# Patient Record
Sex: FEMALE | Race: WHITE | NOT HISPANIC OR LATINO | URBAN - METROPOLITAN AREA
[De-identification: names, ages, dates, MRNs, and addresses within clinical notes are randomized per-mention and may not be internally consistent; named-entity substitution may affect disease eponyms.]

---

## 2017-03-16 ENCOUNTER — OUTPATIENT (OUTPATIENT)
Dept: OUTPATIENT SERVICES | Facility: HOSPITAL | Age: 57
LOS: 1 days | Discharge: HOME | End: 2017-03-16

## 2017-03-16 DIAGNOSIS — C50.919 MALIGNANT NEOPLASM OF UNSPECIFIED SITE OF UNSPECIFIED FEMALE BREAST: ICD-10-CM

## 2017-03-17 ENCOUNTER — APPOINTMENT (OUTPATIENT)
Dept: HEMATOLOGY ONCOLOGY | Facility: CLINIC | Age: 57
End: 2017-03-17

## 2017-03-17 VITALS
HEART RATE: 81 BPM | RESPIRATION RATE: 16 BRPM | TEMPERATURE: 98.4 F | OXYGEN SATURATION: 99 % | SYSTOLIC BLOOD PRESSURE: 183 MMHG | BODY MASS INDEX: 30.29 KG/M2 | WEIGHT: 188.5 LBS | DIASTOLIC BLOOD PRESSURE: 85 MMHG

## 2017-03-17 DIAGNOSIS — I10 ESSENTIAL (PRIMARY) HYPERTENSION: ICD-10-CM

## 2017-04-18 ENCOUNTER — RESULT REVIEW (OUTPATIENT)
Age: 57
End: 2017-04-18

## 2017-09-29 ENCOUNTER — OUTPATIENT (OUTPATIENT)
Dept: OUTPATIENT SERVICES | Facility: HOSPITAL | Age: 57
LOS: 1 days | Discharge: HOME | End: 2017-09-29

## 2017-09-29 DIAGNOSIS — C50.919 MALIGNANT NEOPLASM OF UNSPECIFIED SITE OF UNSPECIFIED FEMALE BREAST: ICD-10-CM

## 2017-10-03 ENCOUNTER — APPOINTMENT (OUTPATIENT)
Dept: HEMATOLOGY ONCOLOGY | Facility: CLINIC | Age: 57
End: 2017-10-03
Payer: COMMERCIAL

## 2017-10-03 VITALS
OXYGEN SATURATION: 100 % | TEMPERATURE: 98.3 F | BODY MASS INDEX: 30.05 KG/M2 | WEIGHT: 186.95 LBS | DIASTOLIC BLOOD PRESSURE: 84 MMHG | HEART RATE: 77 BPM | SYSTOLIC BLOOD PRESSURE: 162 MMHG | RESPIRATION RATE: 16 BRPM

## 2017-10-03 PROCEDURE — 99214 OFFICE O/P EST MOD 30 MIN: CPT

## 2017-10-03 RX ORDER — PRAVASTATIN SODIUM 20 MG/1
20 TABLET ORAL DAILY
Qty: 30 | Refills: 0 | Status: DISCONTINUED | COMMUNITY
Start: 2017-03-17 | End: 2017-10-03

## 2017-10-03 RX ORDER — UBIDECARENONE/VIT E ACET 100MG-5
50 MCG CAPSULE ORAL
Qty: 1 | Refills: 0 | Status: ACTIVE | COMMUNITY
Start: 2017-10-03

## 2018-04-02 ENCOUNTER — OUTPATIENT (OUTPATIENT)
Dept: OUTPATIENT SERVICES | Facility: HOSPITAL | Age: 58
LOS: 1 days | Discharge: HOME | End: 2018-04-02

## 2018-04-02 DIAGNOSIS — C50.919 MALIGNANT NEOPLASM OF UNSPECIFIED SITE OF UNSPECIFIED FEMALE BREAST: ICD-10-CM

## 2018-04-20 ENCOUNTER — APPOINTMENT (OUTPATIENT)
Dept: HEMATOLOGY ONCOLOGY | Facility: CLINIC | Age: 58
End: 2018-04-20
Payer: COMMERCIAL

## 2018-04-20 VITALS
DIASTOLIC BLOOD PRESSURE: 80 MMHG | TEMPERATURE: 98.1 F | RESPIRATION RATE: 16 BRPM | HEART RATE: 79 BPM | SYSTOLIC BLOOD PRESSURE: 130 MMHG | WEIGHT: 185.19 LBS | BODY MASS INDEX: 29.76 KG/M2 | OXYGEN SATURATION: 99 %

## 2018-04-20 PROCEDURE — 99214 OFFICE O/P EST MOD 30 MIN: CPT

## 2018-08-14 ENCOUNTER — RESULT REVIEW (OUTPATIENT)
Age: 58
End: 2018-08-14

## 2018-12-06 ENCOUNTER — RESULT REVIEW (OUTPATIENT)
Age: 58
End: 2018-12-06

## 2020-01-02 ENCOUNTER — RESULT REVIEW (OUTPATIENT)
Age: 60
End: 2020-01-02

## 2021-01-29 ENCOUNTER — RESULT REVIEW (OUTPATIENT)
Age: 61
End: 2021-01-29

## 2021-03-18 ENCOUNTER — APPOINTMENT (OUTPATIENT)
Dept: ULTRASOUND IMAGING | Facility: HOSPITAL | Age: 61
End: 2021-03-18
Payer: COMMERCIAL

## 2021-03-18 ENCOUNTER — OUTPATIENT (OUTPATIENT)
Dept: OUTPATIENT SERVICES | Facility: HOSPITAL | Age: 61
LOS: 1 days | End: 2021-03-18
Payer: COMMERCIAL

## 2021-03-18 ENCOUNTER — APPOINTMENT (OUTPATIENT)
Dept: MAMMOGRAPHY | Facility: HOSPITAL | Age: 61
End: 2021-03-18
Payer: COMMERCIAL

## 2021-03-18 PROCEDURE — 77065 DX MAMMO INCL CAD UNI: CPT | Mod: 26,LT

## 2021-03-18 PROCEDURE — 19285 PERQ DEV BREAST 1ST US IMAG: CPT | Mod: LT

## 2021-03-18 PROCEDURE — 19285 PERQ DEV BREAST 1ST US IMAG: CPT

## 2021-03-18 PROCEDURE — C1739: CPT

## 2021-03-18 PROCEDURE — 77065 DX MAMMO INCL CAD UNI: CPT

## 2021-03-19 ENCOUNTER — APPOINTMENT (OUTPATIENT)
Dept: DISASTER EMERGENCY | Facility: CLINIC | Age: 61
End: 2021-03-19

## 2021-03-19 DIAGNOSIS — Z01.818 ENCOUNTER FOR OTHER PREPROCEDURAL EXAMINATION: ICD-10-CM

## 2021-03-20 LAB — SARS-COV-2 N GENE NPH QL NAA+PROBE: NOT DETECTED

## 2021-03-21 ENCOUNTER — TRANSCRIPTION ENCOUNTER (OUTPATIENT)
Age: 61
End: 2021-03-21

## 2021-03-21 ENCOUNTER — RESULT REVIEW (OUTPATIENT)
Age: 61
End: 2021-03-21

## 2021-03-22 ENCOUNTER — OUTPATIENT (OUTPATIENT)
Dept: OUTPATIENT SERVICES | Facility: HOSPITAL | Age: 61
LOS: 1 days | Discharge: HOME | End: 2021-03-22
Payer: COMMERCIAL

## 2021-03-22 PROCEDURE — 76098 X-RAY EXAM SURGICAL SPECIMEN: CPT | Mod: 26

## 2021-03-22 PROCEDURE — 88305 TISSUE EXAM BY PATHOLOGIST: CPT | Mod: 26

## 2021-03-22 PROCEDURE — 88307 TISSUE EXAM BY PATHOLOGIST: CPT | Mod: 26

## 2021-03-25 LAB — SURGICAL PATHOLOGY STUDY: SIGNIFICANT CHANGE UP

## 2021-04-12 ENCOUNTER — OUTPATIENT (OUTPATIENT)
Dept: OUTPATIENT SERVICES | Facility: HOSPITAL | Age: 61
LOS: 1 days | Discharge: HOME | End: 2021-04-12

## 2021-04-12 DIAGNOSIS — C50.919 MALIGNANT NEOPLASM OF UNSPECIFIED SITE OF UNSPECIFIED FEMALE BREAST: ICD-10-CM

## 2021-04-14 ENCOUNTER — APPOINTMENT (OUTPATIENT)
Dept: HEMATOLOGY ONCOLOGY | Facility: CLINIC | Age: 61
End: 2021-04-14
Payer: COMMERCIAL

## 2021-04-14 VITALS
DIASTOLIC BLOOD PRESSURE: 84 MMHG | BODY MASS INDEX: 30.33 KG/M2 | WEIGHT: 188.71 LBS | OXYGEN SATURATION: 98 % | RESPIRATION RATE: 16 BRPM | HEIGHT: 66.14 IN | TEMPERATURE: 96.8 F | SYSTOLIC BLOOD PRESSURE: 168 MMHG | HEART RATE: 89 BPM

## 2021-04-14 PROCEDURE — 99072 ADDL SUPL MATRL&STAF TM PHE: CPT

## 2021-04-14 PROCEDURE — 99215 OFFICE O/P EST HI 40 MIN: CPT

## 2021-04-14 RX ORDER — ROSUVASTATIN CALCIUM 20 MG/1
20 TABLET, FILM COATED ORAL
Refills: 0 | Status: ACTIVE | COMMUNITY

## 2021-04-14 RX ORDER — PRAVASTATIN SODIUM 40 MG/1
40 TABLET ORAL DAILY
Qty: 30 | Refills: 0 | Status: DISCONTINUED | COMMUNITY
Start: 2017-10-03 | End: 2021-04-14

## 2021-04-14 RX ORDER — LOSARTAN POTASSIUM 100 MG/1
TABLET, FILM COATED ORAL
Refills: 0 | Status: ACTIVE | COMMUNITY

## 2021-04-15 RX ORDER — BENZONATATE 200 MG/1
200 CAPSULE ORAL
Qty: 21 | Refills: 0 | Status: DISCONTINUED | COMMUNITY
Start: 2021-01-14

## 2021-04-15 RX ORDER — PROMETHAZINE HYDROCHLORIDE 6.25 MG/5ML
6.25 SOLUTION ORAL
Qty: 120 | Refills: 0 | Status: DISCONTINUED | COMMUNITY
Start: 2021-01-13

## 2021-04-15 RX ORDER — ROSUVASTATIN CALCIUM 10 MG/1
10 TABLET, FILM COATED ORAL
Qty: 30 | Refills: 0 | Status: DISCONTINUED | COMMUNITY
Start: 2021-03-16

## 2021-04-15 RX ORDER — OXYCODONE AND ACETAMINOPHEN 5; 325 MG/1; MG/1
5-325 TABLET ORAL
Qty: 12 | Refills: 0 | Status: DISCONTINUED | COMMUNITY
Start: 2021-03-22

## 2021-04-15 NOTE — HISTORY OF PRESENT ILLNESS
[Disease: _____________________] : Disease: [unfilled] [T: ___] : T[unfilled] [N: ___] : N[unfilled] [M: ___] : M[unfilled] [AJCC Stage: ____] : AJCC Stage: [unfilled] [de-identified] : H/O left breast cancer s/p LLE/SLND for 4.5 cm poorly diff IDC with 0/2 SLNs and 0/4 AXLNs, ER/LA neg and her 2 odalys neg on core bx; s/p DD AC-T. At that time found to be her 2 odalys on the excisional specimen on FISH study. S/P 1 yr trastuzumab therapy in 5/13. Also s/p adjuvant XRT.  [de-identified] : ER neg [place] neg Her 2 odalys + [de-identified] : In the interim:\par \par Had breast imaging 2/21 and ultimate L breast bx with DCIS, ER+ [place]+. \par \par Spoke with Dr Cohen and with pt by phone - she initially wanted B/L MRMs. Ultimately she decided to proceed with LLE / SLND which was performed on 3/22/21:\par \par Final Diagnosis\par \par 1. Left breast, lumpectomy:\par - Duct carcinoma, in situ (DCIS), intermediate grade,\par cribriform type, with central necrosis and calcifications.\par - Resection margins negative for DCIS, closest margin,\par posterior, 2 mm.\par - Breast with radiation associated changes.\par - Prior biopsy site present.\par - ER/AL results see 12-LK-\par - See synoptic report\par \par 2. Additional lateral margin:\par - Benign breast tissue with radiation associated changes.\par - Microcalcifications identified.\par \par 3. Additional superior margin:\par - Benign fibrotic breast tissue.\par \par She reports having further genetic panel testing "which was neg" per pt.\par \par Here for routine f/u. \par \par She remains anxious about her recent dx and also remains upset about  passing from met prostate cancer several mo ago.\par \par She otherwise notes a good appetite, stable weight and excellent performance status.  \par \par \par \par

## 2021-04-15 NOTE — PHYSICAL EXAM
[Fully active, able to carry on all pre-disease performance without restriction] : Status 0 - Fully active, able to carry on all pre-disease performance without restriction [Normal] : affect appropriate [de-identified] : right w/o masses; s/p  LE with well healed scar, no nipple retraction skin dimpling or palp masses.  Left s/p LE with healing scar, no nipple retraction skin dimpling or palp masses.  w/o masses. B/l ax neg

## 2021-04-27 ENCOUNTER — APPOINTMENT (OUTPATIENT)
Dept: PLASTIC SURGERY | Facility: CLINIC | Age: 61
End: 2021-04-27
Payer: COMMERCIAL

## 2021-04-27 VITALS — WEIGHT: 188 LBS | HEIGHT: 66 IN | BODY MASS INDEX: 30.22 KG/M2 | HEART RATE: 85 BPM | OXYGEN SATURATION: 98 %

## 2021-04-27 DIAGNOSIS — Z63.4 DISAPPEARANCE AND DEATH OF FAMILY MEMBER: ICD-10-CM

## 2021-04-27 DIAGNOSIS — F17.200 NICOTINE DEPENDENCE, UNSPECIFIED, UNCOMPLICATED: ICD-10-CM

## 2021-04-27 DIAGNOSIS — Z90.49 ACQUIRED ABSENCE OF OTHER SPECIFIED PARTS OF DIGESTIVE TRACT: ICD-10-CM

## 2021-04-27 DIAGNOSIS — Z92.3 PERSONAL HISTORY OF IRRADIATION: ICD-10-CM

## 2021-04-27 DIAGNOSIS — Z78.9 OTHER SPECIFIED HEALTH STATUS: ICD-10-CM

## 2021-04-27 PROCEDURE — 99072 ADDL SUPL MATRL&STAF TM PHE: CPT

## 2021-04-27 PROCEDURE — 99204 OFFICE O/P NEW MOD 45 MIN: CPT

## 2021-04-27 SDOH — SOCIAL STABILITY - SOCIAL INSECURITY: DISSAPEARANCE AND DEATH OF FAMILY MEMBER: Z63.4

## 2021-04-27 NOTE — CONSULT LETTER
[Consult Letter:] : I had the pleasure of evaluating your patient, [unfilled]. [Please see my note below.] : Please see my note below. [Consult Closing:] : Thank you very much for allowing me to participate in the care of this patient.  If you have any questions, please do not hesitate to contact me. [Sincerely,] : Sincerely, [FreeTextEntry2] : Del Cohen MD\par [address] [place] [address]\par [place]. [place] [address]\par  [FreeTextEntry3] : Oren Lerman, MD, FACS\par Cosmetic & Reconstructive Plastic Surgery\par Director, Aesthetic and Reconstructive Breast Surgery Fellowship - [facility]\par Associate Professor of Surgery, [facility]\par Past President, [place] [employer]\par Tel: [phone]\par Fax: [phone]\par [email or web address]\par

## 2021-04-27 NOTE — ASSESSMENT
[FreeTextEntry1] : I reviewed with Ms. COTTON  in detail the risks, benefits, and alternatives of both implant based breast reconstruction as well as autologous tissue reconstruction. \par \par Given her history of radiation and smoking - prosthetic recon is high risk for complications. \par \par I explained to her that an implant reconstruction usually consists of two separate stages with two surgeries spaced about 4 months apart. At the time of the mastectomy, a tissue expander is placed underneath the pectoralis muscle or on top of the pectoralis muscle and is often reinforced with biologic mesh - acellular dermal matrix. We expand the tissue expander secondarily in the office by injecting saline into the implant percutaneously until the desired size breast mound is achieved. At that point a second stage operation is scheduled where we take her back to operating room and remove the tissue expander and place a permanent breast implant.  The permanent prosthesis can be either silicone or saline.  The first stage of the reconstruction is approximately 3 hours for one breast and 4-5 hours for a bilateral procedure, with a 1-2 night hospital stay and a four-week recovery.  The second stage surgery is an outpatient procedure with a two-week recovery. I reviewed with her the risks of implant reconstruction including but not limited to bleeding, scarring, implant infection, implant rupture, capsule contracture, implant malposition, asymmetry, contour abnormality, and need for revision surgeries. I also discussed the FDA recommendations for silicone implant rupture screening with MRI as well as the details of BII - (breast implant illness) and ALCL. \par \par I then reviewed with ROBBIANKIT  the details of autologous tissue reconstruction, specifically using a free flap from her lower abdomen.  This operation entails transplanting the skin, the fat, and blood vessels from the lower abdomen up to the chest wall where we reattach the artery and vein to blood vessels on the chest wall behind the rib to re-vascularize the tissue called a ‘flap’ utilizing microsurgical techniques. We attempt to save all of the muscle fibers of the abdominal rectus muscle to minimize the chance of an abdominal wall weakness, bulge or hernia and only transfer the perforating blood vessels.  This is called a HAWA flap.  I explained to her the scar burden associated with this operation including the scars on the breasts and the lower abdomen and around the umbilicus.  I explained to her that there is a risk of free flap loss and vessel thrombosis requiring a return to the operating room for emergent exploration in an attempt to salvage the flap.  If we do lose a flap do to vascular compromise, we would likely place a tissue expander at the time of her returning to the operating room in order to preserve the breast skin envelope and perform a delayed reconstruction.  I reviewed the risks of abdominal wall morbidity including but not limited to abdominal wall weakness, hernia, or bulge. \par \par The HAWA flap is designed to minimize the risk of abdominal wall morbidity as opposed to a TRAM flap that cuts the abdominal wall muscle, but even a HAWA flap has a small chance of abdominal wall bulge, weakness or hernia.  This operation is approximately 6 hours for one side and 9 hours for a bilateral procedure with a three day hospitalization and a six-week recovery period. I also explained that there is a possibility of contour abnormality, asymmetry between the two breasts, and possible need for revision surgery. A surgery on the native contralateral breast to achieve symmetry in the setting of a unilateral mastectomy is usually required and often performed at the time of the implant exchange or nipple reconstruction. The nipple areolar reconstruction is performed at a later date as a separate procedure. We may also perform immediate T4 sensory nerve repair with AxoGen nerve interposition graft to treat postmastectomy hypesthesia and pain syndrome.\par

## 2021-04-27 NOTE — HISTORY OF PRESENT ILLNESS
[FreeTextEntry1] : 59 y/o F referred by Dr. Cohen presents for initial consultation regarding  breast reconstruction. She has a hx of recurrent left DCIS and is s/p lumpectomy in 2010 and most recently on 3/22/21. She recently noticed a new lump in her left breast which she is going to have biopsied on Thursday. If it is cancerous she is planning to have a b/l mastectomy. No family hx of breast cancer, she had genetic testing completed with negative results. She had radiation and chemotherapy to the left breast in 2010. No AI treatment.\par \par She has a hx of high blood pressure which is under control with medication.\par \par SHx: She smokes 3 cigarettes per day to cope with her  passing away a few months ago.\par \par No hx of DVTs or blood clots. no family hx of blood clots or bleeding disorders. VTE risk factor score = 8\par \par \par \par

## 2021-04-27 NOTE — PHYSICAL EXAM
[de-identified] : prior lumpectomy scars left breast upper outer and lower inner quadrant, firm, indurated, periareolar incision well healed, left slight larger than right, SN-N: 32 cm b/l BD: 18 cm b/l, left nipple slightly retracted, grade II ptosis\par  [de-identified] : NT, ND, no masses, no scar, +adequate tissue for reconstruction\par

## 2021-04-29 ENCOUNTER — RESULT REVIEW (OUTPATIENT)
Age: 61
End: 2021-04-29

## 2021-05-18 ENCOUNTER — OUTPATIENT (OUTPATIENT)
Dept: OUTPATIENT SERVICES | Facility: HOSPITAL | Age: 61
LOS: 1 days | Discharge: HOME | End: 2021-05-18

## 2021-05-18 DIAGNOSIS — C50.919 MALIGNANT NEOPLASM OF UNSPECIFIED SITE OF UNSPECIFIED FEMALE BREAST: ICD-10-CM

## 2021-05-19 ENCOUNTER — APPOINTMENT (OUTPATIENT)
Dept: HEMATOLOGY ONCOLOGY | Facility: CLINIC | Age: 61
End: 2021-05-19
Payer: COMMERCIAL

## 2021-05-19 VITALS
OXYGEN SATURATION: 97 % | DIASTOLIC BLOOD PRESSURE: 98 MMHG | TEMPERATURE: 96.6 F | BODY MASS INDEX: 30.54 KG/M2 | SYSTOLIC BLOOD PRESSURE: 164 MMHG | WEIGHT: 190.04 LBS | HEART RATE: 94 BPM | RESPIRATION RATE: 17 BRPM | HEIGHT: 66.06 IN

## 2021-05-19 DIAGNOSIS — Z00.00 ENCOUNTER FOR GENERAL ADULT MEDICAL EXAMINATION W/OUT ABNORMAL FINDINGS: ICD-10-CM

## 2021-05-19 PROCEDURE — 99072 ADDL SUPL MATRL&STAF TM PHE: CPT

## 2021-05-19 PROCEDURE — 99214 OFFICE O/P EST MOD 30 MIN: CPT

## 2021-05-20 NOTE — HISTORY OF PRESENT ILLNESS
[Disease: _____________________] : Disease: [unfilled] [T: ___] : T[unfilled] [N: ___] : N[unfilled] [M: ___] : M[unfilled] [AJCC Stage: ____] : AJCC Stage: [unfilled] [de-identified] : ER neg [place] neg Her 2 odalys + [de-identified] : H/O left breast cancer s/p LLE/SLND for 4.5 cm poorly diff IDC with 0/2 SLNs and 0/4 AXLNs, ER/[place] neg and her 2 odalys neg on core bx; s/p DD AC-T. At that time found to be her 2 odalys on the excisional specimen on FISH study. Consequently s/p 1 yr trastuzumab therapy in 5/13. Also s/p adjuvant XRT. \par \par Had breast imaging 2/21 with the finding on mammogram of a subcm mass with associated calcifications in L breast 5-6:00 with and sono showing a hypoechoic mass correlating to the mammo finding. Ultimately a L breast bx showed DCIS, ER+ [place]+. \par \par Spoke with Dr Cohen and with pt by phone - she initially wanted B/L MRMs. Ultimately she decided to proceed with LLE / SLND which was performed on 3/22/21:\par \par Final Diagnosis\par \par 1. Left breast, lumpectomy:\par - Duct carcinoma, in situ (DCIS), intermediate grade,\par cribriform type, with central necrosis and calcifications.\par - Resection margins negative for DCIS, closest margin,\par posterior, 2 mm.\par - Breast with radiation associated changes.\par - Prior biopsy site present.\par - ER/[place] results see 20-RP-\par - See synoptic report\par \par 2. Additional lateral margin:\par - Benign breast tissue with radiation associated changes.\par - Microcalcifications identified.\par \par 3. Additional superior margin:\par - Benign fibrotic breast tissue.\par \par She reports having further genetic panel testing "which was neg" per pt.\par  [de-identified] : In the interim:\par \par Here for routine f/u. \par \par \par I saw her in consultation on 4/14/21. We had a long discussion re:\par \par Long d/w pt re:\par     \par     1) residual very small risk of met recurrence of her prev TN br ca \par     \par     2) implications of new DCIS in ipsilateral breast. The ROR locally and implications for increased risk of new br cancers were revd.\par     \par     3) As had prior XRT to L br - unable to easily / safely deliver further XRT w/o increased risks - pt was already aware\par     \par     4) The option of LMRM (or B/L MRM which she cont to favor) was discussed and its potential redn in ROR locally and for new br ca.\par     \par     5) The options of keeping her breast and not taking adjuvant / chemopreventive DON or AI and the ROR locally and of new br ca was revd\par     \par     6) The option of don vs AI in this setting, the pot risks benefits and side effects were revd.\par     \par     Pt was unsure re what she wished to do. She was to see a reconstructive surgeon and then make a decision. \par \par In the interim she decided against a LMRM or B/L MRM out of concern re the potential risks and prolonged anesthesia / surgery. \par \par She is here today for further treatment recommendations. She notes a good appetite stable weight and excellent performance status. \par \par She remains anxious re her DCIS, and upset at the loss of her . \par \par

## 2021-05-20 NOTE — PHYSICAL EXAM
[Fully active, able to carry on all pre-disease performance without restriction] : Status 0 - Fully active, able to carry on all pre-disease performance without restriction [Normal] : affect appropriate [de-identified] : Right w/o nipple retraction, skin dimpling or palp masses; Left s/p LE with well healed scar, no nipple retraction skin dimpling or palp masses.  B/L ax neg

## 2021-11-15 ENCOUNTER — OUTPATIENT (OUTPATIENT)
Dept: OUTPATIENT SERVICES | Facility: HOSPITAL | Age: 61
LOS: 1 days | Discharge: HOME | End: 2021-11-15

## 2021-11-15 DIAGNOSIS — C50.919 MALIGNANT NEOPLASM OF UNSPECIFIED SITE OF UNSPECIFIED FEMALE BREAST: ICD-10-CM

## 2021-11-17 ENCOUNTER — APPOINTMENT (OUTPATIENT)
Dept: HEMATOLOGY ONCOLOGY | Facility: CLINIC | Age: 61
End: 2021-11-17
Payer: COMMERCIAL

## 2021-11-17 VITALS
HEART RATE: 91 BPM | BODY MASS INDEX: 30.47 KG/M2 | WEIGHT: 189.6 LBS | TEMPERATURE: 96.8 F | DIASTOLIC BLOOD PRESSURE: 104 MMHG | OXYGEN SATURATION: 98 % | HEIGHT: 66.06 IN | RESPIRATION RATE: 17 BRPM | SYSTOLIC BLOOD PRESSURE: 173 MMHG

## 2021-11-17 PROCEDURE — 99214 OFFICE O/P EST MOD 30 MIN: CPT

## 2021-11-19 NOTE — END OF VISIT
[Time Spent: ___ minutes] : I have spent [unfilled] minutes of time on the encounter. [FreeTextEntry3] : Pt seen examined and d/w fellow. Agree with above A/P

## 2021-11-19 NOTE — PHYSICAL EXAM
[Fully active, able to carry on all pre-disease performance without restriction] : Status 0 - Fully active, able to carry on all pre-disease performance without restriction [Normal] : affect appropriate [de-identified] : Right w/o nipple retraction, skin dimpling or palp masses; Left s/p LE with well healed scar, no nipple retraction skin dimpling or palp masses.  B/L ax neg

## 2021-11-19 NOTE — HISTORY OF PRESENT ILLNESS
[Disease: _____________________] : Disease: [unfilled] [T: ___] : T[unfilled] [N: ___] : N[unfilled] [M: ___] : M[unfilled] [AJCC Stage: ____] : AJCC Stage: [unfilled] [de-identified] : H/O left breast cancer s/p LLE/SLND for 4.5 cm poorly diff IDC with 0/2 SLNs and 0/4 AXLNs, ER/[place] neg and her 2 odalys neg on core bx; s/p DD AC-T. At that time found to be her 2 odalys on the excisional specimen on FISH study. Consequently s/p 1 yr trastuzumab therapy in 5/13. Also s/p adjuvant XRT. \par \par Had breast imaging 2/21 with the finding on mammogram of a subcm mass with associated calcifications in L breast 5-6:00 with and sono showing a hypoechoic mass correlating to the mammo finding. Ultimately a L breast bx showed DCIS, ER+ GA+. \par \par Spoke with Dr Cohen and with pt by phone - she initially wanted B/L MRMs. Ultimately she decided to proceed with LLE / SLND which was performed on 3/22/21:\par \par Final Diagnosis\par \par 1. Left breast, lumpectomy:\par - Duct carcinoma, in situ (DCIS), intermediate grade,\par cribriform type, with central necrosis and calcifications.\par - Resection margins negative for DCIS, closest margin,\par posterior, 2 mm.\par - Breast with radiation associated changes.\par - Prior biopsy site present.\par - ER/GA results see 67-FO-\par - See synoptic report\par \par 2. Additional lateral margin:\par - Benign breast tissue with radiation associated changes.\par - Microcalcifications identified.\par \par 3. Additional superior margin:\par - Benign fibrotic breast tissue.\par \par She reports having further genetic panel testing "which was neg" per pt.\par  [de-identified] : ER neg [place] neg Her 2 odalys + [de-identified] : Here for routine f/u. \par She has been doing well and tolerates anastrozole well. \par Denies joint pain, hot flashes, N/V, headache/dizziness/lightheadedness, or any pain. \par \par L MMG 10/5/2021 - BI-RADS3 probably benign post surgical change. recommends repeating B/L MMG/US in 2/2022. Also annual MRI\par DEXA 6/21 osteopenia

## 2021-12-30 ENCOUNTER — RX RENEWAL (OUTPATIENT)
Age: 61
End: 2021-12-30

## 2022-02-01 ENCOUNTER — RESULT REVIEW (OUTPATIENT)
Age: 62
End: 2022-02-01

## 2022-03-28 ENCOUNTER — NON-APPOINTMENT (OUTPATIENT)
Age: 62
End: 2022-03-28

## 2022-05-19 ENCOUNTER — OUTPATIENT (OUTPATIENT)
Dept: OUTPATIENT SERVICES | Facility: HOSPITAL | Age: 62
LOS: 1 days | Discharge: HOME | End: 2022-05-19

## 2022-05-19 DIAGNOSIS — C50.919 MALIGNANT NEOPLASM OF UNSPECIFIED SITE OF UNSPECIFIED FEMALE BREAST: ICD-10-CM

## 2022-05-25 ENCOUNTER — APPOINTMENT (OUTPATIENT)
Dept: HEMATOLOGY ONCOLOGY | Facility: CLINIC | Age: 62
End: 2022-05-25
Payer: COMMERCIAL

## 2022-05-25 VITALS
RESPIRATION RATE: 16 BRPM | TEMPERATURE: 97.4 F | OXYGEN SATURATION: 98 % | HEIGHT: 66.06 IN | WEIGHT: 182.32 LBS | DIASTOLIC BLOOD PRESSURE: 83 MMHG | SYSTOLIC BLOOD PRESSURE: 163 MMHG | HEART RATE: 90 BPM | BODY MASS INDEX: 29.3 KG/M2

## 2022-05-25 PROCEDURE — 99214 OFFICE O/P EST MOD 30 MIN: CPT

## 2022-05-26 RX ORDER — METFORMIN HYDROCHLORIDE 500 MG/1
500 TABLET, COATED ORAL
Qty: 30 | Refills: 0 | Status: DISCONTINUED | COMMUNITY
Start: 2022-02-05

## 2022-05-26 RX ORDER — CIPROFLOXACIN HYDROCHLORIDE 250 MG/1
250 TABLET, FILM COATED ORAL
Qty: 20 | Refills: 0 | Status: DISCONTINUED | COMMUNITY
Start: 2022-01-12

## 2022-05-26 RX ORDER — AMOXICILLIN AND CLAVULANATE POTASSIUM 875; 125 MG/1; MG/1
875-125 TABLET, COATED ORAL
Qty: 14 | Refills: 0 | Status: DISCONTINUED | COMMUNITY
Start: 2022-04-12

## 2022-05-26 RX ORDER — AMLODIPINE AND OLMESARTAN MEDOXOMIL 5; 40 MG/1; MG/1
5-40 TABLET ORAL
Qty: 30 | Refills: 0 | Status: ACTIVE | COMMUNITY
Start: 2022-02-03

## 2022-05-26 NOTE — PHYSICAL EXAM
[Fully active, able to carry on all pre-disease performance without restriction] : Status 0 - Fully active, able to carry on all pre-disease performance without restriction [Normal] : affect appropriate [de-identified] : Right w/o nipple retraction, skin dimpling or palp masses; Left s/p LE with well healed scar, no nipple retraction skin dimpling or palp masses.  B/L ax neg

## 2022-05-26 NOTE — HISTORY OF PRESENT ILLNESS
[Disease: _____________________] : Disease: [unfilled] [T: ___] : T[unfilled] [N: ___] : N[unfilled] [M: ___] : M[unfilled] [AJCC Stage: ____] : AJCC Stage: [unfilled] [de-identified] : H/O left breast cancer s/p LLE/SLND for 4.5 cm poorly diff IDC with 0/2 SLNs and 0/4 AXLNs, ER/MI neg and her 2 odalys neg on core bx; s/p DD AC-T. At that time found to be her 2 odalys on the excisional specimen on FISH study. Consequently s/p 1 yr trastuzumab therapy in 5/13. Also s/p adjuvant XRT. \par \par Had breast imaging 2/21 with the finding on mammogram of a subcm mass with associated calcifications in L breast 5-6:00 with and sono showing a hypoechoic mass correlating to the mammo finding. Ultimately a L breast bx showed DCIS, ER+ MI+. \par \par Spoke with Dr Cohen and with pt by phone - she initially wanted B/L MRMs. Ultimately she decided to proceed with LLE / SLND which was performed on 3/22/21:\par \par Final Diagnosis\par \par 1. Left breast, lumpectomy:\par - Duct carcinoma, in situ (DCIS), intermediate grade,\par cribriform type, with central necrosis and calcifications.\par - Resection margins negative for DCIS, closest margin,\par posterior, 2 mm.\par - Breast with radiation associated changes.\par - Prior biopsy site present.\par - ER/MI results see 98-ZK-\par - See synoptic report\par \par 2. Additional lateral margin:\par - Benign breast tissue with radiation associated changes.\par - Microcalcifications identified.\par \par 3. Additional superior margin:\par - Benign fibrotic breast tissue.\par \par She reports having further genetic panel testing "which was neg" per pt.\par  [de-identified] : ER neg MI neg Her 2 odalys + [de-identified] : Here for routine f/u. \par \par Remains on anastrozole and denies any treatment related side effects.\par \par She had called in the interim with c/o R>L shoulder pain and advised to hold the anastrozole - there was incomplete resolution. She restarted the anastrozole and the pain has continued to decrease and now back to baseline - she notes that she had prior fractures of B/L clavicles and from time to time she gets associated pain and she does not believe it is from the anastrozole at his time. She believes it was from picking up heavy boxes when she helped her dtr move as it started after that.   \par \par Denies all other complaints. Notes a good appetite stable wt and excellent performance status. \par \par L MMG 10/5/2021 - BI-RADS3 probably benign post surgical change. recommends repeating \par B/L MMG/US in 2/2022 neg /post treatment changes\par DEXA 6/21 osteopenia

## 2022-06-21 ENCOUNTER — RESULT REVIEW (OUTPATIENT)
Age: 62
End: 2022-06-21

## 2022-06-27 ENCOUNTER — OUTPATIENT (OUTPATIENT)
Dept: OUTPATIENT SERVICES | Facility: HOSPITAL | Age: 62
LOS: 1 days | End: 2022-06-27
Payer: COMMERCIAL

## 2022-06-27 ENCOUNTER — APPOINTMENT (OUTPATIENT)
Dept: CT IMAGING | Facility: IMAGING CENTER | Age: 62
End: 2022-06-27
Payer: COMMERCIAL

## 2022-06-27 DIAGNOSIS — C50.919 MALIGNANT NEOPLASM OF UNSPECIFIED SITE OF UNSPECIFIED FEMALE BREAST: ICD-10-CM

## 2022-06-27 DIAGNOSIS — Z00.8 ENCOUNTER FOR OTHER GENERAL EXAMINATION: ICD-10-CM

## 2022-06-27 PROCEDURE — 74160 CT ABDOMEN W/CONTRAST: CPT | Mod: 26

## 2022-06-27 PROCEDURE — 71260 CT THORAX DX C+: CPT | Mod: 26

## 2022-06-27 PROCEDURE — 74160 CT ABDOMEN W/CONTRAST: CPT

## 2022-06-27 PROCEDURE — 71260 CT THORAX DX C+: CPT

## 2022-11-17 ENCOUNTER — OUTPATIENT (OUTPATIENT)
Dept: OUTPATIENT SERVICES | Facility: HOSPITAL | Age: 62
LOS: 1 days | Discharge: HOME | End: 2022-11-17

## 2022-11-17 DIAGNOSIS — C50.919 MALIGNANT NEOPLASM OF UNSPECIFIED SITE OF UNSPECIFIED FEMALE BREAST: ICD-10-CM

## 2022-11-23 ENCOUNTER — APPOINTMENT (OUTPATIENT)
Dept: HEMATOLOGY ONCOLOGY | Facility: CLINIC | Age: 62
End: 2022-11-23

## 2022-11-23 VITALS
BODY MASS INDEX: 29.51 KG/M2 | TEMPERATURE: 98 F | DIASTOLIC BLOOD PRESSURE: 77 MMHG | RESPIRATION RATE: 16 BRPM | SYSTOLIC BLOOD PRESSURE: 126 MMHG | HEIGHT: 66.06 IN | WEIGHT: 183.65 LBS | OXYGEN SATURATION: 98 % | HEART RATE: 94 BPM

## 2022-11-23 PROCEDURE — 99214 OFFICE O/P EST MOD 30 MIN: CPT

## 2022-11-23 NOTE — HISTORY OF PRESENT ILLNESS
[Disease: _____________________] : Disease: [unfilled] [T: ___] : T[unfilled] [N: ___] : N[unfilled] [M: ___] : M[unfilled] [AJCC Stage: ____] : AJCC Stage: [unfilled] [de-identified] : H/O left breast cancer s/p LLE/SLND for 4.5 cm poorly diff IDC with 0/2 SLNs and 0/4 AXLNs, ER/[place] neg and her 2 odalys neg on core bx; s/p DD AC-T. At that time found to be her 2 odalys on the excisional specimen on FISH study. Consequently s/p 1 yr trastuzumab therapy in 5/13. Also s/p adjuvant XRT. \par \par Had breast imaging 2/21 with the finding on mammogram of a subcm mass with associated calcifications in L breast 5-6:00 with and sono showing a hypoechoic mass correlating to the mammo finding. Ultimately a L breast bx showed DCIS, ER+ [place]+. \par \par Spoke with Dr Cohen and with pt by phone - she initially wanted B/L MRMs. Ultimately she decided to proceed with LLE / SLND which was performed on 3/22/21:\par \par Final Diagnosis\par \par 1. Left breast, lumpectomy:\par - Duct carcinoma, in situ (DCIS), intermediate grade,\par cribriform type, with central necrosis and calcifications.\par - Resection margins negative for DCIS, closest margin,\par posterior, 2 mm.\par - Breast with radiation associated changes.\par - Prior biopsy site present.\par - ER/[place] results see 85-FG-\par - See synoptic report\par \par 2. Additional lateral margin:\par - Benign breast tissue with radiation associated changes.\par - Microcalcifications identified.\par \par 3. Additional superior margin:\par - Benign fibrotic breast tissue.\par \par She reports having further genetic panel testing "which was neg" per pt.\par  [de-identified] : ER neg [place] neg Her 2 odayls + [de-identified] : Here for routine f/u. \par \par Remains on anastrozole. \par \par She c/o gradually worsening R> L knee pain over time, preceded AI therapy and prior xray by other MD c/w OA.  Sh ehas fleeting arthralgias shoulders and back when lifting / carrying her 3 grandchildren - always come / go w/o change over time. Questioning whether AI related. \par \par Denies any treatment related side effects.\par \par Prev she had called with c/o R>L shoulder pain and advised to hold the anastrozole - there was incomplete resolution. She restarted the anastrozole and the pain has continued to decrease and then back to baseline - she noted that she had prior fractures of B/L clavicles and from time to time she gets associated pain.   \par \par Denies all other complaints. \par \par Notes a good appetite stable wt and excellent performance status. \par \par B/L MMG/US in 2/2022 neg /post treatment changes\par DEXA 6/21 osteopenia

## 2022-11-23 NOTE — PHYSICAL EXAM
[Fully active, able to carry on all pre-disease performance without restriction] : Status 0 - Fully active, able to carry on all pre-disease performance without restriction [Normal] : affect appropriate [de-identified] : Right w/o nipple retraction, skin dimpling or palp masses; Left s/p LE with well healed scar, no nipple retraction skin dimpling or palp masses.  B/L ax neg Anesthesia Type: 1% lidocaine with epinephrine

## 2023-05-10 ENCOUNTER — OUTPATIENT (OUTPATIENT)
Dept: OUTPATIENT SERVICES | Facility: HOSPITAL | Age: 63
LOS: 1 days | Discharge: HOME | End: 2023-05-10

## 2023-05-10 DIAGNOSIS — C50.919 MALIGNANT NEOPLASM OF UNSPECIFIED SITE OF UNSPECIFIED FEMALE BREAST: ICD-10-CM

## 2023-05-24 ENCOUNTER — APPOINTMENT (OUTPATIENT)
Dept: HEMATOLOGY ONCOLOGY | Facility: CLINIC | Age: 63
End: 2023-05-24
Payer: COMMERCIAL

## 2023-05-24 VITALS
HEART RATE: 78 BPM | WEIGHT: 182.98 LBS | DIASTOLIC BLOOD PRESSURE: 80 MMHG | HEIGHT: 66.06 IN | SYSTOLIC BLOOD PRESSURE: 146 MMHG | RESPIRATION RATE: 16 BRPM | TEMPERATURE: 97.3 F | BODY MASS INDEX: 29.41 KG/M2 | OXYGEN SATURATION: 98 %

## 2023-05-24 PROCEDURE — 99214 OFFICE O/P EST MOD 30 MIN: CPT

## 2023-05-24 NOTE — PHYSICAL EXAM
[Fully active, able to carry on all pre-disease performance without restriction] : Status 0 - Fully active, able to carry on all pre-disease performance without restriction [Normal] : affect appropriate [de-identified] : Right w/o nipple retraction, skin dimpling or palp masses; Left s/p LE with well healed scar, no nipple retraction skin dimpling or palp masses.  B/L ax neg

## 2023-05-24 NOTE — HISTORY OF PRESENT ILLNESS
[Disease: _____________________] : Disease: [unfilled] [T: ___] : T[unfilled] [N: ___] : N[unfilled] [M: ___] : M[unfilled] [AJCC Stage: ____] : AJCC Stage: [unfilled] [de-identified] : H/O left breast cancer s/p LLE/SLND for 4.5 cm poorly diff IDC with 0/2 SLNs and 0/4 AXLNs, ER/[place] neg and her 2 odalys neg on core bx; s/p DD AC-T. At that time found to be her 2 odalys on the excisional specimen on FISH study. Consequently s/p 1 yr trastuzumab therapy in 5/13. Also s/p adjuvant XRT. \par \par Had breast imaging 2/21 with the finding on mammogram of a subcm mass with associated calcifications in L breast 5-6:00 with and sono showing a hypoechoic mass correlating to the mammo finding. Ultimately a L breast bx showed DCIS, ER+ [place]+. \par \par Spoke with Dr Cohen and with pt by phone - she initially wanted B/L MRMs. Ultimately she decided to proceed with LLE / SLND which was performed on 3/22/21:\par \par Final Diagnosis\par \par 1. Left breast, lumpectomy:\par - Duct carcinoma, in situ (DCIS), intermediate grade,\par cribriform type, with central necrosis and calcifications.\par - Resection margins negative for DCIS, closest margin,\par posterior, 2 mm.\par - Breast with radiation associated changes.\par - Prior biopsy site present.\par - ER/MO results see 35-EP-\par - See synoptic report\par \par 2. Additional lateral margin:\par - Benign breast tissue with radiation associated changes.\par - Microcalcifications identified.\par \par 3. Additional superior margin:\par - Benign fibrotic breast tissue.\par \par She reports having further genetic panel testing "which was neg" per pt.\par  [de-identified] : ER neg SD neg Her 2 odalys + [de-identified] : Here for routine f/u. \par \par Remains on anastrozole. \par \par She c/o gradually worsening R> L knee pain over time, preceded AI therapy and prior xray by other MD c/w OA.  \par \par She has fleeting arthralgias shoulders and back when lifting / carrying her 3 grandchildren - always come / go w/o change over time. Questioning whether AI related.\par \par She has fleeting LBP radiating down LLE - this gets better when she "pushed on the lower back" and comes / goes instantaneously.    \par \par Denies any treatment related side effects.\par \par Of note in the past she had called with c/o R>L shoulder pain and advised to hold the anastrozole - there was incomplete resolution. She restarted the anastrozole and the pain went back to baseline - she noted that she had prior fractures of B/L clavicles and from time to time she gets associated pain. Reminded pt of this past histroy that we documented.    \par \par Notes a good appetite stable wt and excellent performance status. \par \par B/L MMG/US in 3/23 neg\par DEXA 6/21 osteopenia

## 2023-09-26 ENCOUNTER — OUTPATIENT (OUTPATIENT)
Dept: OUTPATIENT SERVICES | Facility: HOSPITAL | Age: 63
LOS: 1 days | End: 2023-09-26
Payer: COMMERCIAL

## 2023-09-26 ENCOUNTER — APPOINTMENT (OUTPATIENT)
Dept: RADIOLOGY | Facility: IMAGING CENTER | Age: 63
End: 2023-09-26
Payer: COMMERCIAL

## 2023-09-26 DIAGNOSIS — M85.80 OTHER SPECIFIED DISORDERS OF BONE DENSITY AND STRUCTURE, UNSPECIFIED SITE: ICD-10-CM

## 2023-09-26 PROCEDURE — 77080 DXA BONE DENSITY AXIAL: CPT

## 2023-09-26 PROCEDURE — 77080 DXA BONE DENSITY AXIAL: CPT | Mod: 26

## 2023-09-28 ENCOUNTER — NON-APPOINTMENT (OUTPATIENT)
Age: 63
End: 2023-09-28

## 2023-10-01 PROBLEM — Z92.3 HISTORY OF RADIATION THERAPY: Status: ACTIVE | Noted: 2021-04-27

## 2023-10-10 ENCOUNTER — OUTPATIENT (OUTPATIENT)
Dept: OUTPATIENT SERVICES | Facility: HOSPITAL | Age: 63
LOS: 1 days | Discharge: HOME | End: 2023-10-10

## 2023-10-10 DIAGNOSIS — C50.919 MALIGNANT NEOPLASM OF UNSPECIFIED SITE OF UNSPECIFIED FEMALE BREAST: ICD-10-CM

## 2023-10-11 ENCOUNTER — APPOINTMENT (OUTPATIENT)
Dept: HEMATOLOGY ONCOLOGY | Facility: CLINIC | Age: 63
End: 2023-10-11
Payer: COMMERCIAL

## 2023-10-11 VITALS
DIASTOLIC BLOOD PRESSURE: 83 MMHG | TEMPERATURE: 98.4 F | WEIGHT: 181.88 LBS | HEIGHT: 66.06 IN | SYSTOLIC BLOOD PRESSURE: 146 MMHG | HEART RATE: 80 BPM | RESPIRATION RATE: 16 BRPM | BODY MASS INDEX: 29.23 KG/M2 | OXYGEN SATURATION: 98 %

## 2023-10-11 PROCEDURE — 99214 OFFICE O/P EST MOD 30 MIN: CPT

## 2023-10-11 RX ORDER — ANASTROZOLE TABLETS 1 MG/1
1 TABLET ORAL
Qty: 90 | Refills: 3 | Status: DISCONTINUED | COMMUNITY
Start: 2021-05-19 | End: 2023-10-11

## 2024-02-08 ENCOUNTER — OUTPATIENT (OUTPATIENT)
Dept: OUTPATIENT SERVICES | Facility: HOSPITAL | Age: 64
LOS: 1 days | Discharge: HOME | End: 2024-02-08

## 2024-02-08 DIAGNOSIS — C50.919 MALIGNANT NEOPLASM OF UNSPECIFIED SITE OF UNSPECIFIED FEMALE BREAST: ICD-10-CM

## 2024-02-23 ENCOUNTER — APPOINTMENT (OUTPATIENT)
Dept: HEMATOLOGY ONCOLOGY | Facility: CLINIC | Age: 64
End: 2024-02-23
Payer: COMMERCIAL

## 2024-02-23 VITALS
HEIGHT: 66.06 IN | TEMPERATURE: 98.3 F | HEART RATE: 88 BPM | OXYGEN SATURATION: 97 % | DIASTOLIC BLOOD PRESSURE: 74 MMHG | BODY MASS INDEX: 29.41 KG/M2 | SYSTOLIC BLOOD PRESSURE: 145 MMHG | WEIGHT: 182.98 LBS | RESPIRATION RATE: 16 BRPM

## 2024-02-23 DIAGNOSIS — T45.1X5A PAIN IN UNSPECIFIED JOINT: ICD-10-CM

## 2024-02-23 DIAGNOSIS — M25.50 PAIN IN UNSPECIFIED JOINT: ICD-10-CM

## 2024-02-23 DIAGNOSIS — Z79.811 LONG TERM (CURRENT) USE OF AROMATASE INHIBITORS: ICD-10-CM

## 2024-02-23 PROCEDURE — 99215 OFFICE O/P EST HI 40 MIN: CPT

## 2024-02-23 NOTE — REVIEW OF SYSTEMS
[Anxiety] : anxiety [Diarrhea: Grade 0] : Diarrhea: Grade 0 [Negative] : Allergic/Immunologic [FreeTextEntry9] : as above

## 2024-02-23 NOTE — PHYSICAL EXAM
[Fully active, able to carry on all pre-disease performance without restriction] : Status 0 - Fully active, able to carry on all pre-disease performance without restriction [Normal] : affect appropriate [de-identified] : Right w/o nipple retraction, skin dimpling or palp masses; Left s/p LE with well healed scar, no nipple retraction skin dimpling or palp masses.  B/L ax neg

## 2024-02-23 NOTE — HISTORY OF PRESENT ILLNESS
[T: ___] : T[unfilled] [Disease: _____________________] : Disease: [unfilled] [N: ___] : N[unfilled] [M: ___] : M[unfilled] [AJCC Stage: ____] : AJCC Stage: [unfilled] [de-identified] : 2012 Left breast cancer diagnosed at age 51 treated with s/p LLE/SLND for 4.5 cm poorly diff IDC with 0/2 SLNs and 0/4 AXLNs, ER/AZ neg and HER-2 negative on core bx; s/p DD AC-T. At that time found to be HER-2 positive on the excisional specimen on FISH study. Consequently s/p 1 yr trastuzumab therapy in 5/13. Also s/p adjuvant XRT.   2/21 Mammogram demonstrated a subcm mass with associated calcifications in Left breast 5-6:00 with sono showing a hypoechoic mass correlating to the mammo finding. Left breast bx showed DCIS, ER+ AZ+.  Spoke with Dr Cohen and with pt by phone - she initially wanted B/L MRMs. Ultimately she decided to proceed with LLE / SLND  3/22/21: Left breast, lumpectomy:  Duct carcinoma, in situ (DCIS), intermediate grade, cribriform type, with central necrosis and calcifications. Resection margins negative for DCIS, closest margin, posterior, 2 mm. Breast with radiation associated changes. No radiation due to prior history of radiation. 5/2021 Anastrozole started but tolerated poorly due to severe arthralgias. 10/23 Tamoxifen 20 mg started. Not taking every day so will lower dose to 10 mg in 2/24.  [de-identified] : DCIS, cribriform type, intermediate grade, ER >90%, LA >95% [de-identified] : Fariba is seen to establish care as her previous oncologist has left the practice. She was on anastrozole for 2 1/2 years but developed gradually worseing arthralgias and asked to come off it. She started tamoxifen in 10/23 and is tolerating it much better. However she is anxious about possible side effects so has been taking it every other day. Discussed trying lower dose of 10 mg daily. Notes a good appetite stable wt and excellent performance status.   HEALTH MAINTENANCE: PCP: Dr. Jakob Hough GYN: Dr. Santiago Mammogram & sonogram: 3/7/23 BI-RADS 2 Pap Smear: 2023 negative Bone density: 9/26/23 showed T scores of 0.6 in spine, -1.6 in femoral neck and -1.4 in total hip Colonoscopy: 2023 one benign polyp Genetic testing: negative

## 2024-04-03 ENCOUNTER — APPOINTMENT (OUTPATIENT)
Dept: HEMATOLOGY ONCOLOGY | Facility: CLINIC | Age: 64
End: 2024-04-03

## 2024-04-11 ENCOUNTER — NON-APPOINTMENT (OUTPATIENT)
Age: 64
End: 2024-04-11

## 2024-04-17 ENCOUNTER — NON-APPOINTMENT (OUTPATIENT)
Age: 64
End: 2024-04-17

## 2024-06-24 ENCOUNTER — NON-APPOINTMENT (OUTPATIENT)
Age: 64
End: 2024-06-24

## 2024-06-25 ENCOUNTER — APPOINTMENT (OUTPATIENT)
Dept: HEMATOLOGY ONCOLOGY | Facility: CLINIC | Age: 64
End: 2024-06-25
Payer: COMMERCIAL

## 2024-06-25 VITALS
TEMPERATURE: 98 F | DIASTOLIC BLOOD PRESSURE: 81 MMHG | RESPIRATION RATE: 16 BRPM | SYSTOLIC BLOOD PRESSURE: 131 MMHG | WEIGHT: 185.19 LBS | HEART RATE: 86 BPM | BODY MASS INDEX: 29.83 KG/M2 | OXYGEN SATURATION: 97 %

## 2024-06-25 DIAGNOSIS — C50.919 MALIGNANT NEOPLASM OF UNSPECIFIED SITE OF UNSPECIFIED FEMALE BREAST: ICD-10-CM

## 2024-06-25 DIAGNOSIS — D05.12 INTRADUCTAL CARCINOMA IN SITU OF LEFT BREAST: ICD-10-CM

## 2024-06-25 DIAGNOSIS — M85.80 OTHER SPECIFIED DISORDERS OF BONE DENSITY AND STRUCTURE, UNSPECIFIED SITE: ICD-10-CM

## 2024-06-25 PROCEDURE — G2211 COMPLEX E/M VISIT ADD ON: CPT

## 2024-06-25 PROCEDURE — 99214 OFFICE O/P EST MOD 30 MIN: CPT

## 2024-06-25 RX ORDER — TAMOXIFEN CITRATE 10 MG/1
10 TABLET, FILM COATED ORAL
Qty: 90 | Refills: 1 | Status: ACTIVE | COMMUNITY
Start: 2023-10-11 | End: 1900-01-01

## 2024-06-30 PROBLEM — D05.12 DUCTAL CARCINOMA IN SITU (DCIS) OF LEFT BREAST: Status: ACTIVE | Noted: 2021-04-15

## 2024-06-30 PROBLEM — M85.80 OSTEOPENIA, UNSPECIFIED LOCATION: Status: ACTIVE | Noted: 2022-05-26

## 2025-01-08 ENCOUNTER — OUTPATIENT (OUTPATIENT)
Dept: OUTPATIENT SERVICES | Facility: HOSPITAL | Age: 65
LOS: 1 days | Discharge: HOME | End: 2025-01-08

## 2025-01-08 DIAGNOSIS — C50.919 MALIGNANT NEOPLASM OF UNSPECIFIED SITE OF UNSPECIFIED FEMALE BREAST: ICD-10-CM

## 2025-01-09 ENCOUNTER — APPOINTMENT (OUTPATIENT)
Dept: HEMATOLOGY ONCOLOGY | Facility: CLINIC | Age: 65
End: 2025-01-09
Payer: MEDICARE

## 2025-01-09 ENCOUNTER — NON-APPOINTMENT (OUTPATIENT)
Age: 65
End: 2025-01-09

## 2025-01-09 VITALS
TEMPERATURE: 97.8 F | HEIGHT: 66 IN | DIASTOLIC BLOOD PRESSURE: 83 MMHG | RESPIRATION RATE: 16 BRPM | WEIGHT: 185.19 LBS | BODY MASS INDEX: 29.76 KG/M2 | OXYGEN SATURATION: 97 % | SYSTOLIC BLOOD PRESSURE: 167 MMHG | HEART RATE: 81 BPM

## 2025-01-09 DIAGNOSIS — C50.919 MALIGNANT NEOPLASM OF UNSPECIFIED SITE OF UNSPECIFIED FEMALE BREAST: ICD-10-CM

## 2025-01-09 DIAGNOSIS — N64.4 MASTODYNIA: ICD-10-CM

## 2025-01-09 DIAGNOSIS — M85.80 OTHER SPECIFIED DISORDERS OF BONE DENSITY AND STRUCTURE, UNSPECIFIED SITE: ICD-10-CM

## 2025-01-09 DIAGNOSIS — D05.12 INTRADUCTAL CARCINOMA IN SITU OF LEFT BREAST: ICD-10-CM

## 2025-01-09 PROCEDURE — G2211 COMPLEX E/M VISIT ADD ON: CPT

## 2025-01-09 PROCEDURE — 99204 OFFICE O/P NEW MOD 45 MIN: CPT

## 2025-08-29 DIAGNOSIS — C50.919 MALIGNANT NEOPLASM OF UNSPECIFIED SITE OF UNSPECIFIED FEMALE BREAST: ICD-10-CM

## 2025-09-09 ENCOUNTER — APPOINTMENT (OUTPATIENT)
Dept: HEMATOLOGY ONCOLOGY | Facility: CLINIC | Age: 65
End: 2025-09-09

## 2025-09-09 VITALS
DIASTOLIC BLOOD PRESSURE: 82 MMHG | TEMPERATURE: 97.2 F | RESPIRATION RATE: 16 BRPM | WEIGHT: 182.98 LBS | HEART RATE: 81 BPM | SYSTOLIC BLOOD PRESSURE: 166 MMHG | BODY MASS INDEX: 29.53 KG/M2 | OXYGEN SATURATION: 95 %

## 2025-09-09 DIAGNOSIS — D05.12 INTRADUCTAL CARCINOMA IN SITU OF LEFT BREAST: ICD-10-CM

## 2025-09-09 DIAGNOSIS — C50.919 MALIGNANT NEOPLASM OF UNSPECIFIED SITE OF UNSPECIFIED FEMALE BREAST: ICD-10-CM

## 2025-09-09 DIAGNOSIS — M85.80 OTHER SPECIFIED DISORDERS OF BONE DENSITY AND STRUCTURE, UNSPECIFIED SITE: ICD-10-CM

## 2025-09-09 PROCEDURE — 99214 OFFICE O/P EST MOD 30 MIN: CPT

## 2025-09-09 PROCEDURE — G2211 COMPLEX E/M VISIT ADD ON: CPT
